# Patient Record
Sex: FEMALE | Race: WHITE | NOT HISPANIC OR LATINO | Employment: OTHER | URBAN - METROPOLITAN AREA
[De-identification: names, ages, dates, MRNs, and addresses within clinical notes are randomized per-mention and may not be internally consistent; named-entity substitution may affect disease eponyms.]

---

## 2017-06-16 ENCOUNTER — OFFICE VISIT (OUTPATIENT)
Dept: FAMILY MEDICINE | Facility: CLINIC | Age: 37
End: 2017-06-16
Payer: COMMERCIAL

## 2017-06-16 VITALS
RESPIRATION RATE: 19 BRPM | SYSTOLIC BLOOD PRESSURE: 104 MMHG | HEART RATE: 79 BPM | OXYGEN SATURATION: 98 % | HEIGHT: 67 IN | TEMPERATURE: 97.1 F | DIASTOLIC BLOOD PRESSURE: 70 MMHG

## 2017-06-16 DIAGNOSIS — N63.0 LUMP OR MASS IN BREAST: Primary | ICD-10-CM

## 2017-06-16 PROCEDURE — 99213 OFFICE O/P EST LOW 20 MIN: CPT | Performed by: PHYSICIAN ASSISTANT

## 2017-06-16 NOTE — PROGRESS NOTES
SUBJECTIVE:                                                    Clarice Mayo is a 37 year old female who presents to clinic today for the following health issues:    Breast Problem      Duration: 4 days    Description (location/character/radiation): left breast    Intensity:  mild    Accompanying signs and symptoms: harder spot on left breast-feels like when was breast feeding, left breast bigger than right breast    History (similar episodes/previous evaluation): None    Precipitating or alleviating factors: None    Therapies tried and outcome: None    LMP - 5/29/2017     Left breast is painful, feels like clogged duct.  Hasn't been breastfeeding for a few years, but stperil does produce some milk at times.  Typically gets fullness in both breasts with menstrual cycle, not only one.      Problem list and histories reviewed & adjusted, as indicated.  Additional history: as documented    Patient Active Problem List   Diagnosis     Vitamin D deficiency     CARDIOVASCULAR SCREENING; LDL GOAL LESS THAN 160     JENNA (generalized anxiety disorder)     History reviewed. No pertinent surgical history.    Social History   Substance Use Topics     Smoking status: Never Smoker     Smokeless tobacco: Never Used     Alcohol use Yes     Family History   Problem Relation Age of Onset     Breast Cancer Other      maternal aunt     Colon Cancer Paternal Grandfather      Other Cancer Cousin      cousins & aunts/uncles w/various cancers     Depression Mother      Anxiety Disorder Mother      Substance Abuse Mother      recovering alcoholic since before my birth     OSTEOPOROSIS Other      great-grandmother         Current Outpatient Prescriptions   Medication Sig Dispense Refill     busPIRone (BUSPAR) 10 MG tablet TAKE 1 TABLET(10 MG) BY MOUTH THREE TIMES DAILY 30 tablet 0     No Known Allergies    Reviewed and updated as needed this visit by clinical staff  Tobacco  Allergies  Meds  Problems  Med Hx  Surg Hx  Fam Hx  Soc Hx  "       Reviewed and updated as needed this visit by Provider  Allergies  Meds  Problems         ROS:  Constitutional, HEENT, cardiovascular, pulmonary, gi and gu systems are negative, except as otherwise noted.    OBJECTIVE:                                                    /70 (BP Location: Left arm, Patient Position: Sitting, Cuff Size: Adult Regular)  Pulse 79  Temp 97.1  F (36.2  C) (Oral)  Resp 19  Ht 5' 6.75\" (1.695 m)  LMP 05/29/2017  SpO2 98%  There is no height or weight on file to calculate BMI.  GENERAL: healthy, alert and no distress  NECK: no adenopathy, no asymmetry, masses, or scars and thyroid normal to palpation  RESP: lungs clear to auscultation - no rales, rhonchi or wheezes  BREAST: bilateral fibrous breast especially in 11 o'clock positions; no tenderness or nipple discharge and no palpable axillary masses or adenopathy  CV: regular rate and rhythm, normal S1 S2, no S3 or S4, no murmur, click or rub, no peripheral edema and peripheral pulses strong  PSYCH: mentation appears normal, affect normal/bright    Diagnostic Test Results:  none      ASSESSMENT/PLAN:                                                        ICD-10-CM    1. Lump or mass in breast N63        Patient Instructions   Warm compresses and local massage encouraged.  Monitor over next menstrual cycle of two.  Consider diagnostic mammogram if continues or any changes in symptoms.  Return to clinic with any worsening or changes in symptoms and follow up for routine care.       Gisela Singh PA-C  [facility]  "

## 2017-06-16 NOTE — MR AVS SNAPSHOT
"              After Visit Summary   6/16/2017    Clarice Mayo    MRN: [id number]           Patient Information     Date Of Birth          1980        Visit Information        Provider Department      6/16/2017 11:40 AM Gisela Singh PA-C [facility]         Follow-ups after your visit        Who to contact     If you have questions or need follow up information about today's clinic visit or your schedule please contact [facility] directly at [phone].  Normal or non-critical lab and imaging results will be communicated to you by Onyuhart, letter or phone within 4 business days after the clinic has received the results. If you do not hear from us within 7 days, please contact the clinic through [employer] or phone. If you have a critical or abnormal lab result, we will notify you by phone as soon as possible.  Submit refill requests through HITbills or call your pharmacy and they will forward the refill request to us. Please allow 3 business days for your refill to be completed.          Additional Information About Your Visit        MyChart Information     HITbills gives you secure access to your electronic health record. If you see a primary care provider, you can also send messages to your care team and make appointments. If you have questions, please call your primary care clinic.  If you do not have a primary care provider, please call [phone] and they will assist you.        Care EveryWhere ID     This is your Care EveryWhere ID. This could be used by other organizations to access your [place] medical records  [id number]        Your Vitals Were     Pulse Temperature Respirations Height Last Period Pulse Oximetry    79 97.1  F (36.2  C) (Oral) 19 5' 6.75\" (1.695 m) 05/29/2017 98%       Blood Pressure from Last 3 Encounters:   06/16/17 104/70   07/12/16 106/70   06/04/15 110/70    Weight from Last 3 Encounters:   07/12/16 145 lb 8 oz (66 kg)   06/04/15 144 lb " 4.8 oz (65.5 kg)              Today, you had the following     No orders found for display         Today's Medication Changes          These changes are accurate as of: 6/16/17 11:55 AM.  If you have any questions, ask your nurse or doctor.               Stop taking these medicines if you haven't already. Please contact your care team if you have questions.     ALPRAZolam 0.25 MG tablet   Commonly known as:  XANAX   Stopped by:  Gisela Singh PA-C                    Primary Care Provider Office Phone # Fax #    Gisela Singh PA-C [phone]       [address] [address]        Thank you!     Thank you for choosing [facility]  for your care. Our goal is always to provide you with excellent care. Hearing back from our patients is one way we can continue to improve our services. Please take a few minutes to complete the written survey that you may receive in the mail after your visit with us. Thank you!             Your Updated Medication List - Protect others around you: Learn how to safely use, store and throw away your medicines at www.disposemymeds.org.          This list is accurate as of: 6/16/17 11:55 AM.  Always use your most recent med list.                   Brand Name Dispense Instructions for use    busPIRone 10 MG tablet    BUSPAR    30 tablet    TAKE 1 TABLET(10 MG) BY MOUTH THREE TIMES DAILY

## 2017-06-16 NOTE — PATIENT INSTRUCTIONS
Warm compresses and local massage encouraged.  Monitor over next menstrual cycle of two.  Consider diagnostic mammogram if continues or any changes in symptoms.  Return to clinic with any worsening or changes in symptoms and follow up for routine care.

## 2017-06-16 NOTE — NURSING NOTE
"Chief Complaint   Patient presents with     Breast Mass       Initial /70 (BP Location: Left arm, Patient Position: Sitting, Cuff Size: Adult Regular)  Pulse 79  Temp 97.1  F (36.2  C) (Oral)  Resp 19  Ht 5' 6.75\" (1.695 m)  LMP 05/29/2017  SpO2 98% Estimated body mass index is 22.96 kg/(m^2) as calculated from the following:    Height as of 7/12/16: 5' 6.75\" (1.695 m).    Weight as of 7/12/16: 145 lb 8 oz (66 kg).  Medication Reconciliation: complete     Patient declined racht    Chelsi Best CMA  "

## 2017-06-23 ENCOUNTER — TELEPHONE (OUTPATIENT)
Dept: FAMILY MEDICINE | Facility: CLINIC | Age: 37
End: 2017-06-23

## 2017-06-28 NOTE — TELEPHONE ENCOUNTER
6/28/2017    Call Regarding Preventive Health Screening Cervical/PAP    Attempt 2    Message on voicemail     Comments:       Outreach   CC

## 2017-07-05 NOTE — TELEPHONE ENCOUNTER
7/5/2017    Call Regarding Preventive Health Screening Cervical/PAP    Attempt 3    Message on voicemail     Comments:       Outreach   jay jay

## 2017-12-24 ENCOUNTER — HEALTH MAINTENANCE LETTER (OUTPATIENT)
Age: 37
End: 2017-12-24

## 2018-01-22 ENCOUNTER — ALLIED HEALTH/NURSE VISIT (OUTPATIENT)
Dept: NURSING | Facility: CLINIC | Age: 38
End: 2018-01-22
Payer: COMMERCIAL

## 2018-01-22 DIAGNOSIS — Z23 NEED FOR PROPHYLACTIC VACCINATION AND INOCULATION AGAINST INFLUENZA: Primary | ICD-10-CM

## 2018-01-22 PROCEDURE — 99207 ZZC NO CHARGE NURSE ONLY: CPT

## 2018-01-22 PROCEDURE — 90471 IMMUNIZATION ADMIN: CPT

## 2018-01-22 PROCEDURE — 90686 IIV4 VACC NO PRSV 0.5 ML IM: CPT

## 2018-01-22 NOTE — PROGRESS NOTES
Injectable Influenza Immunization Documentation    1.  Is the person to be vaccinated sick today?   No    2. Does the person to be vaccinated have an allergy to a component   of the vaccine?   No  Egg Allergy Algorithm Link    3. Has the person to be vaccinated ever had a serious reaction   to influenza vaccine in the past?   No    4. Has the person to be vaccinated ever had Guillain-Barré syndrome?   No    Form completed by Vicki Chow MA        Due to injection administration, patient instructed to remain in clinic for 15 minutes  afterwards, and to report any adverse reaction to me immediately.

## 2018-01-22 NOTE — MR AVS SNAPSHOT
After Visit Summary   1/22/2018    Clarice Mayo    MRN: [id number]           Patient Information     Date Of Birth          1980        Visit Information        Provider Department      1/22/2018 4:00 PM  MEDICAL ASSISTANT [facility]        Today's Diagnoses     Need for prophylactic vaccination and inoculation against influenza    -  1       Follow-ups after your visit        Your next 10 appointments already scheduled     Jan 22, 2018  4:00 PM CST   Nurse Only with  MEDICAL ASSISTANT   [facility]    [address] [place] [id number] [phone]              Who to contact     If you have questions or need follow up information about today's clinic visit or your schedule please contact [facility] directly at [phone].  Normal or non-critical lab and imaging results will be communicated to you by MyChart, letter or phone within 4 business days after the clinic has received the results. If you do not hear from us within 7 days, please contact the clinic through JAM Technologieshart or phone. If you have a critical or abnormal lab result, we will notify you by phone as soon as possible.  Submit refill requests through [employer] or call your pharmacy and they will forward the refill request to us. Please allow 3 business days for your refill to be completed.          Additional Information About Your Visit        MyChart Information     [employer] gives you secure access to your electronic health record. If you see a primary care provider, you can also send messages to your care team and make appointments. If you have questions, please call your primary care clinic.  If you do not have a primary care provider, please call [phone] and they will assist you.        Care EveryWhere ID     This is your Care EveryWhere ID. This could be used by other organizations to access your [facility]  records  [id number]         Blood Pressure from Last 3 Encounters:   06/16/17 104/70   07/12/16 106/70   06/04/15 110/70    Weight from Last 3 Encounters:   07/12/16 145 lb 8 oz (66 kg)   06/04/15 144 lb 4.8 oz (65.5 kg)              We Performed the Following     FLU VAC, SPLIT VIRUS IM > 3 YO (QUADRIVALENT) [54104]     Vaccine Administration, Initial [16529]        Primary Care Provider Office Phone # Fax #    Gisela Singh PA-C [phone] [address]  [facility] [address]        Equal Access to Services     DAVON REA : Hadii kelly Patino, waraghavendrada dung, qaybta kaalmada rigoberto, bao montero. So [facility] [phone].    ATENCIÓN: Si habla español, tiene a marquez disposición servicios gratuitos de asistencia lingüística. Llame al [phone].    We comply with applicable federal civil rights laws and [place] laws. We do not discriminate on the basis of race, color, national origin, age, disability, sex, sexual orientation, or gender identity.            Thank you!     Thank you for choosing [facility]  for your care. Our goal is always to provide you with excellent care. Hearing back from our patients is one way we can continue to improve our services. Please take a few minutes to complete the written survey that you may receive in the mail after your visit with us. Thank you!             Your Updated Medication List - Protect others around you: Learn how to safely use, store and throw away your medicines at www.disposemymeds.org.          This list is accurate as of: 1/22/18  3:25 PM.  Always use your most recent med list.                   Brand Name Dispense Instructions for use Diagnosis    busPIRone 10 MG tablet    BUSPAR    30 tablet    Take 1 tablet (10 mg) by mouth 3 times daily    Situational anxiety

## 2018-08-20 ENCOUNTER — OFFICE VISIT (OUTPATIENT)
Dept: FAMILY MEDICINE | Facility: CLINIC | Age: 38
End: 2018-08-20
Payer: COMMERCIAL

## 2018-08-20 VITALS
TEMPERATURE: 98.7 F | HEART RATE: 79 BPM | WEIGHT: 140 LBS | SYSTOLIC BLOOD PRESSURE: 116 MMHG | RESPIRATION RATE: 19 BRPM | DIASTOLIC BLOOD PRESSURE: 78 MMHG | BODY MASS INDEX: 22.5 KG/M2 | OXYGEN SATURATION: 100 % | HEIGHT: 66 IN

## 2018-08-20 DIAGNOSIS — E55.9 VITAMIN D DEFICIENCY: ICD-10-CM

## 2018-08-20 DIAGNOSIS — F41.8 SITUATIONAL ANXIETY: ICD-10-CM

## 2018-08-20 DIAGNOSIS — M54.50 ACUTE BILATERAL LOW BACK PAIN WITHOUT SCIATICA: ICD-10-CM

## 2018-08-20 DIAGNOSIS — Z00.00 ROUTINE GENERAL MEDICAL EXAMINATION AT A HEALTH CARE FACILITY: Primary | ICD-10-CM

## 2018-08-20 PROBLEM — R55 SYNCOPE AND COLLAPSE: Status: ACTIVE | Noted: 2018-08-20

## 2018-08-20 LAB
BASOPHILS # BLD AUTO: 0 10E9/L (ref 0–0.2)
BASOPHILS NFR BLD AUTO: 0.5 %
DIFFERENTIAL METHOD BLD: NORMAL
EOSINOPHIL # BLD AUTO: 0.2 10E9/L (ref 0–0.7)
EOSINOPHIL NFR BLD AUTO: 3.6 %
ERYTHROCYTE [DISTWIDTH] IN BLOOD BY AUTOMATED COUNT: 13.8 % (ref 10–15)
HCT VFR BLD AUTO: 36.4 % (ref 35–47)
HGB BLD-MCNC: 11.8 G/DL (ref 11.7–15.7)
LYMPHOCYTES # BLD AUTO: 1.5 10E9/L (ref 0.8–5.3)
LYMPHOCYTES NFR BLD AUTO: 22.1 %
MCH RBC QN AUTO: 29.4 PG (ref 26.5–33)
MCHC RBC AUTO-ENTMCNC: 32.4 G/DL (ref 31.5–36.5)
MCV RBC AUTO: 91 FL (ref 78–100)
MONOCYTES # BLD AUTO: 0.6 10E9/L (ref 0–1.3)
MONOCYTES NFR BLD AUTO: 9.2 %
NEUTROPHILS # BLD AUTO: 4.3 10E9/L (ref 1.6–8.3)
NEUTROPHILS NFR BLD AUTO: 64.6 %
PLATELET # BLD AUTO: 271 10E9/L (ref 150–450)
RBC # BLD AUTO: 4.02 10E12/L (ref 3.8–5.2)
WBC # BLD AUTO: 6.6 10E9/L (ref 4–11)

## 2018-08-20 PROCEDURE — 85025 COMPLETE CBC W/AUTO DIFF WBC: CPT | Performed by: PHYSICIAN ASSISTANT

## 2018-08-20 PROCEDURE — 36415 COLL VENOUS BLD VENIPUNCTURE: CPT | Performed by: PHYSICIAN ASSISTANT

## 2018-08-20 PROCEDURE — 82306 VITAMIN D 25 HYDROXY: CPT | Performed by: PHYSICIAN ASSISTANT

## 2018-08-20 PROCEDURE — 84443 ASSAY THYROID STIM HORMONE: CPT | Performed by: PHYSICIAN ASSISTANT

## 2018-08-20 PROCEDURE — 82728 ASSAY OF FERRITIN: CPT | Performed by: PHYSICIAN ASSISTANT

## 2018-08-20 PROCEDURE — 99395 PREV VISIT EST AGE 18-39: CPT | Performed by: PHYSICIAN ASSISTANT

## 2018-08-20 RX ORDER — BUSPIRONE HYDROCHLORIDE 10 MG/1
10 TABLET ORAL 3 TIMES DAILY
Qty: 30 TABLET | Refills: 3 | Status: SHIPPED | OUTPATIENT
Start: 2018-08-20 | End: 2019-09-09

## 2018-08-20 RX ORDER — CYCLOBENZAPRINE HCL 10 MG
5-10 TABLET ORAL 3 TIMES DAILY PRN
Qty: 30 TABLET | Refills: 1 | Status: SHIPPED | OUTPATIENT
Start: 2018-08-20 | End: 2019-08-10

## 2018-08-20 ASSESSMENT — ANXIETY QUESTIONNAIRES
5. BEING SO RESTLESS THAT IT IS HARD TO SIT STILL: NOT AT ALL
GAD7 TOTAL SCORE: 5
IF YOU CHECKED OFF ANY PROBLEMS ON THIS QUESTIONNAIRE, HOW DIFFICULT HAVE THESE PROBLEMS MADE IT FOR YOU TO DO YOUR WORK, TAKE CARE OF THINGS AT HOME, OR GET ALONG WITH OTHER PEOPLE: NOT DIFFICULT AT ALL
1. FEELING NERVOUS, ANXIOUS, OR ON EDGE: MORE THAN HALF THE DAYS
6. BECOMING EASILY ANNOYED OR IRRITABLE: SEVERAL DAYS
7. FEELING AFRAID AS IF SOMETHING AWFUL MIGHT HAPPEN: NOT AT ALL
3. WORRYING TOO MUCH ABOUT DIFFERENT THINGS: NOT AT ALL
2. NOT BEING ABLE TO STOP OR CONTROL WORRYING: NOT AT ALL

## 2018-08-20 ASSESSMENT — PATIENT HEALTH QUESTIONNAIRE - PHQ9: 5. POOR APPETITE OR OVEREATING: MORE THAN HALF THE DAYS

## 2018-08-20 NOTE — PATIENT INSTRUCTIONS
"  Preventive Health Recommendations  Female Ages 26 - 39  Yearly exam:   See your health care provider every year in order to    Review health changes.     Discuss preventive care.      Review your medicines if you your doctor has prescribed any.    Until age 30: Get a Pap test every three years (more often if you have had an abnormal result).    After age 30: Talk to your doctor about whether you should have a Pap test every 3 years or have a Pap test with HPV screening every 5 years.   You do not need a Pap test if your uterus was removed (hysterectomy) and you have not had cancer.  You should be tested each year for STDs (sexually transmitted diseases), if you're at risk.   Talk to your provider about how often to have your cholesterol checked.  If you are at risk for diabetes, you should have a diabetes test (fasting glucose).  Shots: Get a flu shot each year. Get a tetanus shot every 10 years.   Nutrition:     Eat at least 5 servings of fruits and vegetables each day.    Eat whole-grain bread, whole-wheat pasta and brown rice instead of white grains and rice.    Get adequate Calcium and Vitamin D.     Lifestyle    Exercise at least 150 minutes a week (30 minutes a day, 5 days of the week). This will help you control your weight and prevent disease.    Limit alcohol to one drink per day.    No smoking.     Wear sunscreen to prevent skin cancer.    See your dentist every six months for an exam and cleaning.      Discussed the pathophysiology of anxiety/depression episodes and the various symptoms seen associated with anxiety episodes. Discussed possible triggers including fatigue, depression, stress, and chemicals such as alcohol, caffeine and certain drugs. Discussed the treatment including an aerobic exercise program, adequate rest, and both rescue meds and maintenance meds.   For your anxiety:   1. Consider therapy - CBT - cognitive behavioral therapy - Fabien Spence's card given to patient.  2. \"The Chemistry " "of Calm\" by Darius Vázquez   3. \"Hope and Help for your Nerves\" by Marika Bartlett   4. Magnesium 250-500 mg nightly;  Vitamin D 0361-0824 IU daily +/- B Complex  5. Valerian root extract for relaxation and sleep OR Melatonin at bedtime.     "

## 2018-08-20 NOTE — MR AVS SNAPSHOT
After Visit Summary   8/20/2018    Clarice Mayo    MRN: [id number]           Patient Information     Date Of Birth          1980        Visit Information        Provider Department      8/20/2018 4:00 PM Gisela Singh PA-C [facility]        Today's Diagnoses     Routine general medical examination at a health care facility    -  1    Situational anxiety        Vitamin D deficiency        Acute bilateral low back pain without sciatica          Care Instructions      Preventive Health Recommendations  Female Ages 26 - 39  Yearly exam:   See your health care provider every year in order to    Review health changes.     Discuss preventive care.      Review your medicines if you your doctor has prescribed any.    Until age 30: Get a Pap test every three years (more often if you have had an abnormal result).    After age 30: Talk to your doctor about whether you should have a Pap test every 3 years or have a Pap test with HPV screening every 5 years.   You do not need a Pap test if your uterus was removed (hysterectomy) and you have not had cancer.  You should be tested each year for STDs (sexually transmitted diseases), if you're at risk.   Talk to your provider about how often to have your cholesterol checked.  If you are at risk for diabetes, you should have a diabetes test (fasting glucose).  Shots: Get a flu shot each year. Get a tetanus shot every 10 years.   Nutrition:     Eat at least 5 servings of fruits and vegetables each day.    Eat whole-grain bread, whole-wheat pasta and brown rice instead of white grains and rice.    Get adequate Calcium and Vitamin D.     Lifestyle    Exercise at least 150 minutes a week (30 minutes a day, 5 days of the week). This will help you control your weight and prevent disease.    Limit alcohol to one drink per day.    No smoking.     Wear sunscreen to prevent skin cancer.    See your dentist every six months for an exam and  "cleaning.      Discussed the pathophysiology of anxiety/depression episodes and the various symptoms seen associated with anxiety episodes. Discussed possible triggers including fatigue, depression, stress, and chemicals such as alcohol, caffeine and certain drugs. Discussed the treatment including an aerobic exercise program, adequate rest, and both rescue meds and maintenance meds.   For your anxiety:   1. Consider therapy - CBT - cognitive behavioral therapy - Fabien Jordy's card given to patient.  2. \"The Chemistry of Calm\" by Darius Vázquez   3. \"Hope and Help for your Nerves\" by Marika Bartlett   4. Vitamin D 6211-1953 IU daily +/- B Complex  5. Valerian root extract for relaxation and sleep OR Melatonin at bedtime.             Follow-ups after your visit        Additional Services     ALLERGY/ASTHMA ADULT REFERRAL       Your provider has referred you to: [facility] - [phone]  [email or web address]  FHN: [facility] - [place] [phone]   [email or web address]  FHN: [facility]. - [place] [phone]   [email or web address]  [facility] - [place] [phone]   [email or web address]  [facility] [phone]   [email or web address]    Please be aware that coverage of these services is subject to the terms and limitations of your health insurance plan.  Call member services at your health plan with any benefit or coverage questions.      Please bring the following with you to your appointment:    (1) Any X-Rays, CTs or MRIs which have been performed.  Contact the facility where they were done to arrange for  prior to your scheduled appointment.    (2) List of current medications  (3) This referral request   (4) Any documents/labs given to you for this referral                  Follow-up " "notes from your care team     Return if symptoms worsen or fail to improve.      Who to contact     If you have questions or need follow up information about today's clinic visit or your schedule please contact [facility] directly at [phone].  Normal or non-critical lab and imaging results will be communicated to you by MyChart, letter or phone within 4 business days after the clinic has received the results. If you do not hear from us within 7 days, please contact the clinic through Auth0hart or phone. If you have a critical or abnormal lab result, we will notify you by phone as soon as possible.  Submit refill requests through [employer] or call your pharmacy and they will forward the refill request to us. Please allow 3 business days for your refill to be completed.          Additional Information About Your Visit        [email or web address] Information     [employer] gives you secure access to your electronic health record. If you see a primary care provider, you can also send messages to your care team and make appointments. If you have questions, please call your primary care clinic.  If you do not have a primary care provider, please call [phone] and they will assist you.        Care EveryWhere ID     This is your Care EveryWhere ID. This could be used by other organizations to access your [place] medical records  [id number]        Your Vitals Were     Pulse Temperature Respirations Height Last Period Pulse Oximetry    79 98.7  F (37.1  C) (Oral) 19 5' 6.25\" (1.683 m) 07/19/2018 100%    BMI (Body Mass Index)                   22.43 kg/m2            Blood Pressure from Last 3 Encounters:   08/20/18 116/78   06/16/17 104/70   07/12/16 106/70    Weight from Last 3 Encounters:   08/20/18 140 lb (63.5 kg)   07/12/16 145 lb 8 oz (66 kg)   06/04/15 144 lb 4.8 oz (65.5 kg)              We Performed the Following     ALLERGY/ASTHMA ADULT REFERRAL     CBC with platelets differential     Ferritin     TSH with " free T4 reflex     Vitamin D Deficiency          Today's Medication Changes          These changes are accurate as of 8/20/18  4:41 PM.  If you have any questions, ask your nurse or doctor.               Start taking these medicines.        Dose/Directions    cyclobenzaprine 10 MG tablet   Commonly known as:  FLEXERIL   Used for:  Acute bilateral low back pain without sciatica   Started by:  Gisela Singh PA-C        Dose:  5-10 mg   Take 0.5-1 tablets (5-10 mg) by mouth 3 times daily as needed for muscle spasms   Quantity:  30 tablet   Refills:  1            Where to get your medicines      These medications were sent to [facility] [address] [address] [address]     Phone:  [phone]     busPIRone 10 MG tablet    cyclobenzaprine 10 MG tablet                Primary Care Provider Office Phone # Fax #    Gisela Singh PA-C [phone]       [address]  [facility] [address]        Equal Access to Services     KARAN [facility] MadisonJUSTIN : Hadii kelly ku hadasho Soomaali, waaxda luqadaha, qaybta kaalmada adeegyada, waxay tristian haykaro barnett . So [facility] [phone].    ATENCIÓN: Si habla español, tiene a marquez disposición servicios gratuitos de asistencia lingüística. Llame al [phone].    We comply with applicable federal civil rights laws and [place] laws. We do not discriminate on the basis of race, color, national origin, age, disability, sex, sexual orientation, or gender identity.            Thank you!     Thank you for choosing [facility]  for your care. Our goal is always to provide you with excellent care. Hearing back from our patients is one way we can continue to improve our services. Please take a few minutes to complete the written survey that you may receive in the mail after your visit with us. Thank you!             Your Updated Medication List - Protect  others around you: Learn how to safely use, store and throw away your medicines at www.disposemymeds.org.          This list is accurate as of 8/20/18  4:41 PM.  Always use your most recent med list.                   Brand Name Dispense Instructions for use Diagnosis    busPIRone 10 MG tablet    BUSPAR    30 tablet    Take 1 tablet (10 mg) by mouth 3 times daily    Situational anxiety       cyclobenzaprine 10 MG tablet    FLEXERIL    30 tablet    Take 0.5-1 tablets (5-10 mg) by mouth 3 times daily as needed for muscle spasms    Acute bilateral low back pain without sciatica

## 2018-08-20 NOTE — PROGRESS NOTES
SUBJECTIVE:   CC: Clarice Mayo is an 38 year old woman who presents for preventive health visit.     Physical   Annual:     Getting at least 3 servings of Calcium per day:  Yes    Bi-annual eye exam:  NO    Dental care twice a year:  NO    Sleep apnea or symptoms of sleep apnea:  Daytime drowsiness    Diet:  Vegetarian/vegan    Frequency of exercise:  2-3 days/week    Duration of exercise:  15-30 minutes    Taking medications regularly:  Yes    Medication side effects:  Not applicable    Additional concerns today:  YES    Other:   - somatic anxiety symptoms, but wondering if something more.      Today's PHQ-2 Score:   PHQ-2 ( 1999 Pfizer) 8/20/2018   Q1: Little interest or pleasure in doing things 0   Q2: Feeling down, depressed or hopeless 0   PHQ-2 Score 0   Q1: Little interest or pleasure in doing things Not at all   Q2: Feeling down, depressed or hopeless Not at all   PHQ-2 Score 0       Abuse: Current or Past (Physical, Sexual or Emotional) - No  Do you feel safe in your environment - Yes    Social History   Substance Use Topics     Smoking status: Never Smoker     Smokeless tobacco: Never Used     Alcohol use Yes     Alcohol Use 8/20/2018   If you drink alcohol do you typically have greater than 3 drinks per day OR greater than 7 drinks per week? No   No flowsheet data found.    Reviewed orders with patient.  Reviewed health maintenance and updated orders accordingly - Yes  Labs reviewed in EPIC  Patient Active Problem List   Diagnosis     Vitamin D deficiency     CARDIOVASCULAR SCREENING; LDL GOAL LESS THAN 160     JENNA (generalized anxiety disorder)     Syncope and collapse     History reviewed. No pertinent surgical history.    Social History   Substance Use Topics     Smoking status: Never Smoker     Smokeless tobacco: Never Used     Alcohol use Yes     Family History   Problem Relation Age of Onset     Breast Cancer Other      maternal aunt     Colon Cancer Paternal Grandfather      Other Cancer  "Cousin      cousins & aunts/uncles w/various cancers     Depression Mother      Anxiety Disorder Mother      Substance Abuse Mother      recovering alcoholic since before my birth     Osteoperosis Other      great-grandmother         Current Outpatient Prescriptions   Medication Sig Dispense Refill     busPIRone (BUSPAR) 10 MG tablet Take 1 tablet (10 mg) by mouth 3 times daily 30 tablet 3     cyclobenzaprine (FLEXERIL) 10 MG tablet Take 0.5-1 tablets (5-10 mg) by mouth 3 times daily as needed for muscle spasms 30 tablet 1     No Known Allergies    Mammogram not appropriate for this patient based on age.    Pertinent mammograms are reviewed under the imaging tab.  History of abnormal Pap smear: NO - age 30- 65 PAP every 3 years recommended     Reviewed and updated as needed this visit by clinical staff  Tobacco  Allergies  Meds  Problems  Med Hx  Surg Hx  Fam Hx  Soc Hx          Reviewed and updated as needed this visit by Provider  Allergies  Meds  Problems            Review of Systems  CONSTITUTIONAL: NEGATIVE for fever, chills, change in weight  INTEGUMENTARU/SKIN: NEGATIVE for worrisome rashes, moles or lesions  EYES: NEGATIVE for vision changes or irritation  ENT: NEGATIVE for ear, mouth and throat problems  RESP: NEGATIVE for significant cough or SOB  BREAST: NEGATIVE for masses, tenderness or discharge  CV: NEGATIVE for chest pain, palpitations or peripheral edema  GI: NEGATIVE for nausea, abdominal pain, heartburn, or change in bowel habits  : NEGATIVE for unusual urinary or vaginal symptoms. Periods are regular.  MUSCULOSKELETAL: NEGATIVE for significant arthralgias or myalgia  NEURO: NEGATIVE for weakness, dizziness or paresthesias  PSYCHIATRIC: NEGATIVE for changes in mood or affect     OBJECTIVE:   /78 (BP Location: Left arm, Patient Position: Sitting, Cuff Size: Adult Regular)  Pulse 79  Temp 98.7  F (37.1  C) (Oral)  Resp 19  Ht 5' 6.25\" (1.683 m)  Wt 140 lb (63.5 kg)  LMP " "07/19/2018  SpO2 100%  BMI 22.43 kg/m2  Physical Exam  GENERAL: healthy, alert and no distress  EYES: Eyes grossly normal to inspection, PERRL and conjunctivae and sclerae normal  HENT: ear canals and TM's normal, nose and mouth without ulcers or lesions  NECK: no adenopathy, no asymmetry, masses, or scars and thyroid normal to palpation  RESP: lungs clear to auscultation - no rales, rhonchi or wheezes  CV: regular rate and rhythm, normal S1 S2, no S3 or S4, no murmur, click or rub, no peripheral edema and peripheral pulses strong  ABDOMEN: soft, nontender, no hepatosplenomegaly, no masses and bowel sounds normal  MS: no gross musculoskeletal defects noted, no edema  SKIN: no suspicious lesions or rashes  NEURO: Normal strength and tone, mentation intact and speech normal  PSYCH: mentation appears normal, affect normal/bright    Diagnostic Test Results:  none     ASSESSMENT/PLAN:       ICD-10-CM    1. Routine general medical examination at a health care facility Z00.00 ALLERGY/ASTHMA ADULT REFERRAL   2. Situational anxiety F41.8 busPIRone (BUSPAR) 10 MG tablet     CBC with platelets differential     Ferritin     TSH with free T4 reflex   3. Vitamin D deficiency E55.9 Vitamin D Deficiency   4. Acute bilateral low back pain without sciatica M54.5 cyclobenzaprine (FLEXERIL) 10 MG tablet       COUNSELING:  Reviewed preventive health counseling, as reflected in patient instructions       Regular exercise       Healthy diet/nutrition       Vision screening    BP Readings from Last 1 Encounters:   08/20/18 116/78     Estimated body mass index is 22.43 kg/(m^2) as calculated from the following:    Height as of this encounter: 5' 6.25\" (1.683 m).    Weight as of this encounter: 140 lb (63.5 kg).   reports that she has never smoked. She has never used smokeless tobacco.      Counseling Resources:  ATP IV Guidelines  Pooled Cohorts Equation Calculator  Breast Cancer Risk Calculator  FRAX Risk Assessment  ICSI Preventive " Guidelines  Dietary Guidelines for Americans, 2010  USDA's MyPlate  ASA Prophylaxis  Lung CA Screening    Gisela Singh PA-C  [facility] [place]    Answers for HPI/ROS submitted by the patient on 8/20/2018   PHQ-2 Score: 0

## 2018-08-21 LAB
DEPRECATED CALCIDIOL+CALCIFEROL SERPL-MC: 35 UG/L (ref 20–75)
FERRITIN SERPL-MCNC: 19 NG/ML (ref 12–150)
TSH SERPL DL<=0.005 MIU/L-ACNC: 2.35 MU/L (ref 0.4–4)

## 2018-08-21 ASSESSMENT — ANXIETY QUESTIONNAIRES: GAD7 TOTAL SCORE: 5

## 2018-08-21 ASSESSMENT — PATIENT HEALTH QUESTIONNAIRE - PHQ9: SUM OF ALL RESPONSES TO PHQ QUESTIONS 1-9: 6

## 2018-08-21 NOTE — PROGRESS NOTES
"Shelia Oneil  Your attached labs are reassuringly (annoyingly?) normal.   I chatted with our neurologist, and it's definitely an option to follow up with him regarding some of your symptoms as the next step.  Let me know how/if you would like to proceed.  Please contact the office with any questions or concerns.    Gisela Mitchell \"Chucho\" CHYNA Singh  "

## 2018-08-29 ENCOUNTER — MYC MEDICAL ADVICE (OUTPATIENT)
Dept: FAMILY MEDICINE | Facility: CLINIC | Age: 38
End: 2018-08-29

## 2018-11-28 ENCOUNTER — ALLIED HEALTH/NURSE VISIT (OUTPATIENT)
Dept: NURSING | Facility: CLINIC | Age: 38
End: 2018-11-28
Payer: COMMERCIAL

## 2018-11-28 DIAGNOSIS — Z23 NEED FOR PROPHYLACTIC VACCINATION AND INOCULATION AGAINST INFLUENZA: Primary | ICD-10-CM

## 2018-11-28 PROCEDURE — 99207 ZZC NO CHARGE NURSE ONLY: CPT

## 2018-11-28 PROCEDURE — 90471 IMMUNIZATION ADMIN: CPT

## 2018-11-28 PROCEDURE — 90686 IIV4 VACC NO PRSV 0.5 ML IM: CPT

## 2018-11-28 NOTE — MR AVS SNAPSHOT
After Visit Summary   11/28/2018    Clarice Mayo    MRN: [id number]           Patient Information     Date Of Birth          1980        Visit Information        Provider Department      11/28/2018 10:00 AM [facility] MEDICAL ASSISTANT [facility]        Today's Diagnoses     Need for prophylactic vaccination and inoculation against influenza    -  1       Follow-ups after your visit        Who to contact     If you have questions or need follow up information about today's clinic visit or your schedule please [facility] directly at [phone].  Normal or non-critical lab and imaging results will be communicated to you by Penemarie K Murphyhart, letter or phone within 4 business days after the clinic has received the results. If you do not hear from us within 7 days, please contact the clinic through Only-apartment[employer] or phone. If you have a critical or abnormal lab result, we will notify you by phone as soon as possible.  Submit refill requests through [employer] or call your pharmacy and they will forward the refill request to us. Please allow 3 business days for your refill to be completed.          Additional Information About Your Visit        MyChart Information     [employer] gives you secure access to your electronic health record. If you see a primary care provider, you can also send messages to your care team and make appointments. If you have questions, please call your primary care clinic.  If you do not have a primary care provider, please call [phone] and they will assist you.        Care EveryWhere ID     This is your Care EveryWhere ID. This could be used by other organizations to access your [facility] medical records  [id number]         Blood Pressure from Last 3 Encounters:   08/20/18 116/78   06/16/17 104/70   07/12/16 106/70    Weight from Last 3 Encounters:   08/20/18 140 lb (63.5 kg)   07/12/16 145 lb 8 oz (66 kg)   06/04/15 144 lb 4.8 oz (65.5 kg)              We  Performed the Following     FLU VACCINE, SPLIT VIRUS, IM (QUADRIVALENT) [73120]- >3 YRS     Vaccine Administration, Initial [06101]        Primary Care Provider Office Phone # Fax #    Gisela Singh PA-C [phone] [address]  [facility] [address]        Equal Access to Services     DAVON REA : Hadii aad ku hadasho Soomaali, waaxda luqadaha, qaybta kaalmada adeegyada, waxay angelin hayaan adeeg karelypriscillacarl labridgette . So [facility] [phone].    ATENCIÓN: Si habla español, tiene a marquez disposición servicios gratuitos de asistencia lingüística. Kate al [phone].    We comply with applicable federal civil rights laws and [place] laws. We do not discriminate on the basis of race, color, national origin, age, disability, sex, sexual orientation, or gender identity.            Thank you!     Thank you for choosing [facility]  for your care. Our goal is always to provide you with excellent care. Hearing back from our patients is one way we can continue to improve our services. Please take a few minutes to complete the written survey that you may receive in the mail after your visit with us. Thank you!             Your Updated Medication List - Protect others around you: Learn how to safely use, store and throw away your medicines at www.disposemymeds.org.          This list is accurate as of 11/28/18 10:05 AM.  Always use your most recent med list.                   Brand Name Dispense Instructions for use Diagnosis    busPIRone 10 MG tablet    BUSPAR    30 tablet    Take 1 tablet (10 mg) by mouth 3 times daily    Situational anxiety       cyclobenzaprine 10 MG tablet    FLEXERIL    30 tablet    Take 0.5-1 tablets (5-10 mg) by mouth 3 times daily as needed for muscle spasms    Acute bilateral low back pain without sciatica

## 2018-11-28 NOTE — PROGRESS NOTES

## 2019-08-10 DIAGNOSIS — M54.50 ACUTE BILATERAL LOW BACK PAIN WITHOUT SCIATICA: ICD-10-CM

## 2019-08-10 NOTE — TELEPHONE ENCOUNTER
Requested Prescriptions   Pending Prescriptions Disp Refills     cyclobenzaprine (FLEXERIL) 10 MG tablet [Pharmacy Med Name: CYCLOBENZAPRINE 10MG TABLETS] 30 tablet 0     Sig: TAKE 1/2 TO 1 TABLET(5 TO 10 MG) BY MOUTH THREE TIMES DAILY AS NEEDED FOR MUSCLE SPASMS  Last Written Prescription Date:  8/20/18  Last Fill Quantity: 30 tab,  # refills: 1   Last office visit: 8/20/2018 with prescribing provider:  Samantha   Future Office Visit:         There is no refill protocol information for this order

## 2019-08-12 RX ORDER — CYCLOBENZAPRINE HCL 10 MG
TABLET ORAL
Qty: 30 TABLET | Refills: 0 | Status: SHIPPED | OUTPATIENT
Start: 2019-08-12 | End: 2019-12-05

## 2019-09-09 DIAGNOSIS — F41.8 SITUATIONAL ANXIETY: ICD-10-CM

## 2019-09-09 NOTE — TELEPHONE ENCOUNTER
"Requested Prescriptions   Pending Prescriptions Disp Refills     busPIRone (BUSPAR) 10 MG tablet [Pharmacy Med Name: BUSPIRONE 10MG TABLETS] 30 tablet 0     Sig: TAKE 1 TABLET(10 MG) BY MOUTH THREE TIMES DAILY  Last Written Prescription Date:  8/20/2018  Last Fill Quantity: 30 tablet,  # refills: 3   Last Office Visit: 8/20/2018   Future Office Visit:            Atypical Antidepressants Protocol Failed - 9/9/2019  5:52 PM        Failed - Recent (12 mo) or future (30 days) visit within the authorizing provider's specialty     Patient had office visit in the last 12 months or has a visit in the next 30 days with authorizing provider or within the authorizing provider's specialty.  See \"Patient Info\" tab in inbasket, or \"Choose Columns\" in Meds & Orders section of the refill encounter.            Passed - Medication active on med list        Passed - Patient is age 18 or older        Passed - No active pregnancy on record        Passed - No positive pregnancy test in past 12 mos          "

## 2019-09-12 RX ORDER — BUSPIRONE HYDROCHLORIDE 10 MG/1
TABLET ORAL
Qty: 30 TABLET | Refills: 0 | Status: SHIPPED | OUTPATIENT
Start: 2019-09-12 | End: 2019-12-05

## 2019-09-12 NOTE — TELEPHONE ENCOUNTER
Routing refill request to provider for review/approval because:  --A break in medication  --Patient may be taking PRN?  I am not able to reach her to confirm.  --Patient is overdue for annual office visit.  --I pended minimal dispense with reminder -  for provider to authorize if ok.     ,  --Please contact patient and ask her to schedule an annual office visit.    --If not able to reach live please mail letter.  --A refill request for below medication was sent to the provider.

## 2019-12-03 ASSESSMENT — ENCOUNTER SYMPTOMS
JOINT SWELLING: 0
PARESTHESIAS: 0
BREAST MASS: 0
NAUSEA: 0
FREQUENCY: 0
ARTHRALGIAS: 0
NERVOUS/ANXIOUS: 0
WEAKNESS: 0
EYE PAIN: 0
HEMATURIA: 0
CONSTIPATION: 0
CHILLS: 0
MYALGIAS: 1
PALPITATIONS: 0
ABDOMINAL PAIN: 0
SORE THROAT: 0
FEVER: 0
SHORTNESS OF BREATH: 0
HEADACHES: 1
HEMATOCHEZIA: 0
COUGH: 0
HEARTBURN: 0
DIZZINESS: 0
DIARRHEA: 0
DYSURIA: 0

## 2019-12-05 ENCOUNTER — OFFICE VISIT (OUTPATIENT)
Dept: FAMILY MEDICINE | Facility: CLINIC | Age: 39
End: 2019-12-05
Payer: COMMERCIAL

## 2019-12-05 VITALS
DIASTOLIC BLOOD PRESSURE: 68 MMHG | SYSTOLIC BLOOD PRESSURE: 109 MMHG | HEART RATE: 91 BPM | RESPIRATION RATE: 22 BRPM | HEIGHT: 67 IN | TEMPERATURE: 98.6 F | BODY MASS INDEX: 21.66 KG/M2 | OXYGEN SATURATION: 100 % | WEIGHT: 138 LBS

## 2019-12-05 DIAGNOSIS — Z23 NEED FOR IMMUNIZATION AGAINST INFLUENZA: ICD-10-CM

## 2019-12-05 DIAGNOSIS — Z00.00 ROUTINE GENERAL MEDICAL EXAMINATION AT A HEALTH CARE FACILITY: Primary | ICD-10-CM

## 2019-12-05 DIAGNOSIS — R82.90 NONSPECIFIC FINDING ON EXAMINATION OF URINE: ICD-10-CM

## 2019-12-05 DIAGNOSIS — M54.50 ACUTE BILATERAL LOW BACK PAIN WITHOUT SCIATICA: ICD-10-CM

## 2019-12-05 DIAGNOSIS — R10.2 PELVIC PAIN IN FEMALE: ICD-10-CM

## 2019-12-05 DIAGNOSIS — F41.8 SITUATIONAL ANXIETY: ICD-10-CM

## 2019-12-05 LAB
ALBUMIN UR-MCNC: NEGATIVE MG/DL
APPEARANCE UR: CLEAR
BACTERIA #/AREA URNS HPF: ABNORMAL /HPF
BASOPHILS # BLD AUTO: 0 10E9/L (ref 0–0.2)
BASOPHILS NFR BLD AUTO: 0.2 %
BILIRUB UR QL STRIP: NEGATIVE
COLOR UR AUTO: YELLOW
DIFFERENTIAL METHOD BLD: NORMAL
EOSINOPHIL # BLD AUTO: 0.1 10E9/L (ref 0–0.7)
EOSINOPHIL NFR BLD AUTO: 0.8 %
ERYTHROCYTE [DISTWIDTH] IN BLOOD BY AUTOMATED COUNT: 13.4 % (ref 10–15)
GLUCOSE UR STRIP-MCNC: NEGATIVE MG/DL
HCT VFR BLD AUTO: 38.7 % (ref 35–47)
HGB BLD-MCNC: 12.5 G/DL (ref 11.7–15.7)
HGB UR QL STRIP: ABNORMAL
KETONES UR STRIP-MCNC: NEGATIVE MG/DL
LEUKOCYTE ESTERASE UR QL STRIP: ABNORMAL
LYMPHOCYTES # BLD AUTO: 1 10E9/L (ref 0.8–5.3)
LYMPHOCYTES NFR BLD AUTO: 11.2 %
MCH RBC QN AUTO: 29.7 PG (ref 26.5–33)
MCHC RBC AUTO-ENTMCNC: 32.3 G/DL (ref 31.5–36.5)
MCV RBC AUTO: 92 FL (ref 78–100)
MONOCYTES # BLD AUTO: 0.6 10E9/L (ref 0–1.3)
MONOCYTES NFR BLD AUTO: 7.3 %
NEUTROPHILS # BLD AUTO: 6.9 10E9/L (ref 1.6–8.3)
NEUTROPHILS NFR BLD AUTO: 80.5 %
NITRATE UR QL: NEGATIVE
NON-SQ EPI CELLS #/AREA URNS LPF: ABNORMAL /LPF
PH UR STRIP: 5.5 PH (ref 5–7)
PLATELET # BLD AUTO: 329 10E9/L (ref 150–450)
RBC # BLD AUTO: 4.21 10E12/L (ref 3.8–5.2)
RBC #/AREA URNS AUTO: ABNORMAL /HPF
SOURCE: ABNORMAL
SP GR UR STRIP: <=1.005 (ref 1–1.03)
UROBILINOGEN UR STRIP-ACNC: 0.2 EU/DL (ref 0.2–1)
WBC # BLD AUTO: 8.5 10E9/L (ref 4–11)
WBC #/AREA URNS AUTO: ABNORMAL /HPF

## 2019-12-05 PROCEDURE — 99395 PREV VISIT EST AGE 18-39: CPT | Mod: 25 | Performed by: PHYSICIAN ASSISTANT

## 2019-12-05 PROCEDURE — 82728 ASSAY OF FERRITIN: CPT | Performed by: PHYSICIAN ASSISTANT

## 2019-12-05 PROCEDURE — 87086 URINE CULTURE/COLONY COUNT: CPT | Performed by: PHYSICIAN ASSISTANT

## 2019-12-05 PROCEDURE — 84443 ASSAY THYROID STIM HORMONE: CPT | Performed by: PHYSICIAN ASSISTANT

## 2019-12-05 PROCEDURE — 90471 IMMUNIZATION ADMIN: CPT | Performed by: PHYSICIAN ASSISTANT

## 2019-12-05 PROCEDURE — 99213 OFFICE O/P EST LOW 20 MIN: CPT | Mod: 25 | Performed by: PHYSICIAN ASSISTANT

## 2019-12-05 PROCEDURE — 90686 IIV4 VACC NO PRSV 0.5 ML IM: CPT | Performed by: PHYSICIAN ASSISTANT

## 2019-12-05 PROCEDURE — 85025 COMPLETE CBC W/AUTO DIFF WBC: CPT | Performed by: PHYSICIAN ASSISTANT

## 2019-12-05 PROCEDURE — 81001 URINALYSIS AUTO W/SCOPE: CPT | Performed by: PHYSICIAN ASSISTANT

## 2019-12-05 PROCEDURE — 36415 COLL VENOUS BLD VENIPUNCTURE: CPT | Performed by: PHYSICIAN ASSISTANT

## 2019-12-05 RX ORDER — BUSPIRONE HYDROCHLORIDE 10 MG/1
TABLET ORAL
Qty: 30 TABLET | Refills: 3 | Status: SHIPPED | OUTPATIENT
Start: 2019-12-05

## 2019-12-05 RX ORDER — CYCLOBENZAPRINE HCL 10 MG
TABLET ORAL
Qty: 30 TABLET | Refills: 1 | Status: SHIPPED | OUTPATIENT
Start: 2019-12-05

## 2019-12-05 ASSESSMENT — ENCOUNTER SYMPTOMS
DIZZINESS: 0
PARESTHESIAS: 0
HEARTBURN: 0
SORE THROAT: 0
NAUSEA: 0
HEMATOCHEZIA: 0
BREAST MASS: 0
WEAKNESS: 0
EYE PAIN: 0
FEVER: 0
CHILLS: 0
DYSURIA: 0
HEADACHES: 1
FREQUENCY: 0
CONSTIPATION: 0
ARTHRALGIAS: 0
PALPITATIONS: 0
HEMATURIA: 0
MYALGIAS: 1
COUGH: 0
SHORTNESS OF BREATH: 0
ABDOMINAL PAIN: 0
NERVOUS/ANXIOUS: 0
JOINT SWELLING: 0
DIARRHEA: 0

## 2019-12-05 ASSESSMENT — ANXIETY QUESTIONNAIRES
7. FEELING AFRAID AS IF SOMETHING AWFUL MIGHT HAPPEN: NOT AT ALL
5. BEING SO RESTLESS THAT IT IS HARD TO SIT STILL: SEVERAL DAYS
1. FEELING NERVOUS, ANXIOUS, OR ON EDGE: SEVERAL DAYS
3. WORRYING TOO MUCH ABOUT DIFFERENT THINGS: NOT AT ALL
IF YOU CHECKED OFF ANY PROBLEMS ON THIS QUESTIONNAIRE, HOW DIFFICULT HAVE THESE PROBLEMS MADE IT FOR YOU TO DO YOUR WORK, TAKE CARE OF THINGS AT HOME, OR GET ALONG WITH OTHER PEOPLE: NOT DIFFICULT AT ALL
2. NOT BEING ABLE TO STOP OR CONTROL WORRYING: NOT AT ALL
6. BECOMING EASILY ANNOYED OR IRRITABLE: NOT AT ALL
GAD7 TOTAL SCORE: 3

## 2019-12-05 ASSESSMENT — MIFFLIN-ST. JEOR: SCORE: 1330.65

## 2019-12-05 ASSESSMENT — PATIENT HEALTH QUESTIONNAIRE - PHQ9: 5. POOR APPETITE OR OVEREATING: SEVERAL DAYS

## 2019-12-05 NOTE — PROGRESS NOTES
SUBJECTIVE:   CC: Clarice Mayo is an 39 year old woman who presents for preventive health visit.   Medical assistant advised patient about addressing additional health concerns that could be billed, as it is not considered a part of a preventive physical. Patient verbalized understanding.    Gisela Singh PA-C on 12/5/2019 at 2:50 PM    Healthy Habits:     Getting at least 3 servings of Calcium per day:  Yes    Bi-annual eye exam:  NO    Dental care twice a year:  NO    Sleep apnea or symptoms of sleep apnea:  None    Diet:  Vegetarian/vegan    Frequency of exercise:  2-3 days/week    Duration of exercise:  15-30 minutes    Taking medications regularly:  Yes    Medication side effects:  None    PHQ-2 Total Score: 0    Additional concerns today:  No    Anxiety Follow-Up    How are you doing with your anxiety since your last visit? Improved     Are you having other symptoms that might be associated with anxiety? No    Have you had a significant life event? No     Are you feeling depressed? No    Do you have any concerns with your use of alcohol or other drugs? No     Patient taking Buspar with overall good results.    Patient has history of pelvic trauma but also having symptoms so worried about missing something.  Patient repots low back pain since this summer, especially after long walks; doesn't really stick around for very long afterwards though.   She also notes vaginal discomfort/twinges, mostly on outside - similar type cramping with menses, but not currently on menses.  Some pinpoint tenderness on bilateral lower pelvic area for a few days over Thanksgiving.  No spotting, discharge, fever, chills, night sweats. Never really gets menses regularly.    Social History     Tobacco Use     Smoking status: Never Smoker     Smokeless tobacco: Never Used   Substance Use Topics     Alcohol use: Yes     Drug use: No     JENNA-7 SCORE 7/12/2016 8/20/2018 12/5/2019   Total Score 4 5 3     PHQ 8/20/2018    PHQ-9 Total Score 6   Q9: Thoughts of better off dead/self-harm past 2 weeks Not at all             Today's PHQ-2 Score:   PHQ-2 ( 1999 Pfizer) 12/3/2019   Q1: Little interest or pleasure in doing things 0   Q2: Feeling down, depressed or hopeless 0   PHQ-2 Score 0   Q1: Little interest or pleasure in doing things Not at all   Q2: Feeling down, depressed or hopeless Not at all   PHQ-2 Score 0       Abuse: Current or Past (Physical, Sexual or Emotional) - No  Do you feel safe in your environment? Yes        Social History     Tobacco Use     Smoking status: Never Smoker     Smokeless tobacco: Never Used   Substance Use Topics     Alcohol use: Yes     If you drink alcohol do you typically have >3 drinks per day or >7 drinks per week? No    No flowsheet data found.    Reviewed orders with patient.  Reviewed health maintenance and updated orders accordingly - Yes  Lab work is in process  Labs reviewed in EPIC  BP Readings from Last 3 Encounters:   12/05/19 109/68   08/20/18 116/78   06/16/17 104/70    Wt Readings from Last 3 Encounters:   12/05/19 62.6 kg (138 lb)   08/20/18 63.5 kg (140 lb)   07/12/16 66 kg (145 lb 8 oz)                  Patient Active Problem List   Diagnosis     Vitamin D deficiency     CARDIOVASCULAR SCREENING; LDL GOAL LESS THAN 160     JENNA (generalized anxiety disorder)     Syncope and collapse     History reviewed. No pertinent surgical history.    Social History     Tobacco Use     Smoking status: Never Smoker     Smokeless tobacco: Never Used   Substance Use Topics     Alcohol use: Yes     Family History   Problem Relation Age of Onset     Breast Cancer Other         maternal aunt     Colon Cancer Paternal Grandfather      Other Cancer Cousin         cousins & aunts/uncles w/various cancers     Depression Mother      Anxiety Disorder Mother      Substance Abuse Mother         recovering alcoholic since before my birth     Osteoporosis Other         great-grandmother         Current Outpatient  "Medications   Medication Sig Dispense Refill     busPIRone (BUSPAR) 10 MG tablet TAKE 1 TABLET(10 MG) BY MOUTH THREE TIMES DAILY 30 tablet 3     cyclobenzaprine (FLEXERIL) 10 MG tablet TAKE 1/2 TO 1 TABLET(5 TO 10 MG) BY MOUTH THREE TIMES DAILY AS NEEDED FOR MUSCLE SPASMS 30 tablet 1     No Known Allergies  Recent Labs   Lab Test 08/20/18  1646 05/29/13   TSH 2.35 1.66        Mammogram not appropriate for this patient based on age.    Pertinent mammograms are reviewed under the imaging tab.  History of abnormal Pap smear: NO - age 30-65 PAP every 5 years with negative HPV co-testing recommended     Reviewed and updated as needed this visit by clinical staff  Tobacco  Allergies  Meds  Problems  Med Hx  Surg Hx  Fam Hx  Soc Hx          Reviewed and updated as needed this visit by Provider  Tobacco  Allergies  Meds  Problems  Med Hx  Surg Hx  Fam Hx            Review of Systems   Constitutional: Negative for chills and fever.   HENT: Negative for congestion, ear pain, hearing loss and sore throat.    Eyes: Negative for pain and visual disturbance.   Respiratory: Negative for cough and shortness of breath.    Cardiovascular: Negative for chest pain, palpitations and peripheral edema.   Gastrointestinal: Negative for abdominal pain, constipation, diarrhea, heartburn, hematochezia and nausea.   Breasts:  Negative for tenderness, breast mass and discharge.   Genitourinary: Negative for dysuria, frequency, genital sores, hematuria, pelvic pain, urgency, vaginal bleeding and vaginal discharge.   Musculoskeletal: Positive for myalgias. Negative for arthralgias and joint swelling.   Skin: Negative for rash.   Neurological: Positive for headaches. Negative for dizziness, weakness and paresthesias.   Psychiatric/Behavioral: Negative for mood changes. The patient is not nervous/anxious.        OBJECTIVE:   /68   Pulse 91   Temp 98.6  F (37  C) (Oral)   Resp 22   Ht 1.689 m (5' 6.5\")   Wt 62.6 kg (138 lb)  "  SpO2 100%   BMI 21.94 kg/m    Physical Exam  GENERAL: healthy, alert and no distress  EYES: Eyes grossly normal to inspection, PERRL and conjunctivae and sclerae normal  HENT: ear canals and TM's normal, nose and mouth without ulcers or lesions  NECK: no adenopathy, no asymmetry, masses, or scars and thyroid normal to palpation  RESP: lungs clear to auscultation - no rales, rhonchi or wheezes  BREAST: normal without masses, tenderness or nipple discharge and no palpable axillary masses or adenopathy  CV: regular rate and rhythm, normal S1 S2, no S3 or S4, no murmur, click or rub, no peripheral edema and peripheral pulses strong  ABDOMEN: soft, nontender, no hepatosplenomegaly, no masses and bowel sounds normal  MS: no gross musculoskeletal defects noted, no edema  SKIN: no suspicious lesions or rashes  NEURO: Normal strength and tone, mentation intact and speech normal  PSYCH: mentation appears normal, affect normal/bright    Diagnostic Test Results:  Labs reviewed in Epic    ASSESSMENT/PLAN:     (R10.2) Pelvic pain in female  Comment: unclear etiology but most likely MSK vs ovarian cyst vs pelvic floor weakness  Plan: CBC with platelets differential, TSH with free         T4 reflex, UA reflex to Microscopic and         Culture, Ferritin        Labs updated today; patient defers pelvic exam at this time; consider pelvic sonogram pending above    (F41.8) Situational anxiety  Comment: Long standing, chronic, controlled at this time  Plan: busPIRone (BUSPAR) 10 MG tablet        Refills sent to pharmacy and continue with therapy    (M54.5) Acute bilateral low back pain without sciatica  Comment: Long standing, chronic, controlled at this time  Plan: cyclobenzaprine (FLEXERIL) 10 MG tablet        Refills sent to pharmacy and over the counter options discussed with patient at length.        ICD-10-CM    1. Routine general medical examination at a health care facility Z00.00    2. Pelvic pain in female R10.2 CBC with  "platelets differential     TSH with free T4 reflex     UA reflex to Microscopic and Culture     Ferritin   3. Situational anxiety F41.8 busPIRone (BUSPAR) 10 MG tablet   4. Acute bilateral low back pain without sciatica M54.5 cyclobenzaprine (FLEXERIL) 10 MG tablet   5. Need for immunization against influenza Z23 INFLUENZA VACCINE IM > 6 MONTHS VALENT IIV4 [08492]       COUNSELING:  Reviewed preventive health counseling, as reflected in patient instructions       Regular exercise       Healthy diet/nutrition       Vision screening    Estimated body mass index is 21.94 kg/m  as calculated from the following:    Height as of this encounter: 1.689 m (5' 6.5\").    Weight as of this encounter: 62.6 kg (138 lb).     reports that she has never smoked. She has never used smokeless tobacco.      Counseling Resources:  ATP IV Guidelines  Pooled Cohorts Equation Calculator  Breast Cancer Risk Calculator  FRAX Risk Assessment  ICSI Preventive Guidelines  Dietary Guidelines for Americans, 2010  USDA's MyPlate  ASA Prophylaxis  Lung CA Screening    Gisela Singh PA-C  [facility]  "

## 2019-12-06 ENCOUNTER — TELEPHONE (OUTPATIENT)
Dept: FAMILY MEDICINE | Facility: CLINIC | Age: 39
End: 2019-12-06

## 2019-12-06 LAB
BACTERIA SPEC CULT: NORMAL
FERRITIN SERPL-MCNC: 9 NG/ML (ref 12–150)
SPECIMEN SOURCE: NORMAL
TSH SERPL DL<=0.005 MIU/L-ACNC: 2.4 MU/L (ref 0.4–4)

## 2019-12-06 ASSESSMENT — ANXIETY QUESTIONNAIRES: GAD7 TOTAL SCORE: 3

## 2019-12-06 NOTE — TELEPHONE ENCOUNTER
Reason for Call:  Request for results:    Name of test or procedure: labs    Date of test of procedure: 12-5-19    Location of the test or procedure: [facility]    OK to leave the result message on voice mail or with a family member? YES    Phone number Patient can be reached at:  Home number on file [phone] (home)    Additional comments: patient is calling just received her test results with my chart and has some questions regarding them. Patient is aware that [employer] is not in. Patient also aware that might not get call back until Monday 12-9-19    Call taken on 12/6/2019 at 4:24 PM by Roxanne Lares

## 2019-12-09 ENCOUNTER — MYC MEDICAL ADVICE (OUTPATIENT)
Dept: FAMILY MEDICINE | Facility: CLINIC | Age: 39
End: 2019-12-09

## 2019-12-09 DIAGNOSIS — D50.9 IRON DEFICIENCY ANEMIA, UNSPECIFIED IRON DEFICIENCY ANEMIA TYPE: Primary | ICD-10-CM

## 2019-12-09 DIAGNOSIS — R82.90 NONSPECIFIC FINDING ON EXAMINATION OF URINE: ICD-10-CM

## 2019-12-09 RX ORDER — NITROFURANTOIN 25; 75 MG/1; MG/1
100 CAPSULE ORAL ONCE
Status: DISCONTINUED | OUTPATIENT
Start: 2019-12-09 | End: 2019-12-09 | Stop reason: CLARIF

## 2019-12-09 RX ORDER — NITROFURANTOIN 25; 75 MG/1; MG/1
100 CAPSULE ORAL 2 TIMES DAILY
Qty: 14 CAPSULE | Refills: 0 | Status: SHIPPED | OUTPATIENT
Start: 2019-12-09 | End: 2019-12-16

## 2019-12-09 NOTE — TELEPHONE ENCOUNTER
Patient states that pharmacy did not receive rx. Writer notes that rx sent today does not say that it was sent electronically or e prescribed. Pharmacy loaded. Thanks!    Faith Jurado

## 2019-12-09 NOTE — RESULT ENCOUNTER NOTE
"Shelia Oneil  Sormercedes for the delay, I agree, let's treat you for a possible UTI.  I will send a prescription for an antibiotic to your pharmacy.  Let's plan to repeat this UA with a lab only appointment in a few weeks though to make sure it has cleared up.  Also your iron is a bit low - start with low dose of ferrous sulfate 325 mgm once daily.    Please contact the office with any questions or concerns.    Gisela Mitchell \"Chucho\" CHYNA Singh    "

## 2019-12-09 NOTE — TELEPHONE ENCOUNTER
Please contact the patient with lab results as soon as possible as patient thinks that she has a UTI.

## 2019-12-09 NOTE — TELEPHONE ENCOUNTER
"Ug, I oddly ordered as a clinic administered med, fixed it and sent to pharmacy.  Sorry, thanks  Gisela \"Chucho\" CHYNA Singh   "

## 2020-02-11 ENCOUNTER — MYC MEDICAL ADVICE (OUTPATIENT)
Dept: FAMILY MEDICINE | Facility: CLINIC | Age: 40
End: 2020-02-11

## 2020-02-12 NOTE — TELEPHONE ENCOUNTER
Did you want to update HM?  Currently set for every three years.  Could edit frequency or postpone for a year.  Mago Agrawal RN

## 2020-03-05 DIAGNOSIS — R82.90 NONSPECIFIC FINDING ON EXAMINATION OF URINE: ICD-10-CM

## 2020-03-05 DIAGNOSIS — D50.9 IRON DEFICIENCY ANEMIA, UNSPECIFIED IRON DEFICIENCY ANEMIA TYPE: ICD-10-CM

## 2020-03-05 LAB
ALBUMIN UR-MCNC: NEGATIVE MG/DL
APPEARANCE UR: CLEAR
BACTERIA #/AREA URNS HPF: ABNORMAL /HPF
BILIRUB UR QL STRIP: NEGATIVE
COLOR UR AUTO: YELLOW
GLUCOSE UR STRIP-MCNC: NEGATIVE MG/DL
HGB UR QL STRIP: ABNORMAL
KETONES UR STRIP-MCNC: NEGATIVE MG/DL
LEUKOCYTE ESTERASE UR QL STRIP: NEGATIVE
NITRATE UR QL: NEGATIVE
NON-SQ EPI CELLS #/AREA URNS LPF: ABNORMAL /LPF
PH UR STRIP: 6.5 PH (ref 5–7)
RBC #/AREA URNS AUTO: ABNORMAL /HPF
SOURCE: ABNORMAL
SP GR UR STRIP: 1.01 (ref 1–1.03)
UROBILINOGEN UR STRIP-ACNC: 0.2 EU/DL (ref 0.2–1)
WBC #/AREA URNS AUTO: ABNORMAL /HPF

## 2020-03-05 PROCEDURE — 36415 COLL VENOUS BLD VENIPUNCTURE: CPT | Performed by: PHYSICIAN ASSISTANT

## 2020-03-05 PROCEDURE — 82728 ASSAY OF FERRITIN: CPT | Performed by: PHYSICIAN ASSISTANT

## 2020-03-05 PROCEDURE — 87086 URINE CULTURE/COLONY COUNT: CPT | Performed by: PHYSICIAN ASSISTANT

## 2020-03-05 PROCEDURE — 81001 URINALYSIS AUTO W/SCOPE: CPT | Performed by: PHYSICIAN ASSISTANT

## 2020-03-05 NOTE — RESULT ENCOUNTER NOTE
"Shelia Oneil  Your attached urinalysis looks much better/ is within normal limits, but I added a urine culture just to be safe (which is still pending).  Please contact the office with any questions or concerns.    Gisela Mitchell \"Chucho\" CHYNA Singh    "

## 2020-03-06 LAB
BACTERIA SPEC CULT: NO GROWTH
FERRITIN SERPL-MCNC: 14 NG/ML (ref 12–150)
SPECIMEN SOURCE: NORMAL

## 2020-03-09 NOTE — RESULT ENCOUNTER NOTE
"Heljenaro Clarice  Urine culture is negative and your ferritin (iron marker) is much improved.    Please contact the office with any questions or concerns.    Gisela Mitchell \"Chucho\" CHYNA Singh    "

## 2020-12-20 ENCOUNTER — HEALTH MAINTENANCE LETTER (OUTPATIENT)
Age: 40
End: 2020-12-20

## 2021-01-09 NOTE — TELEPHONE ENCOUNTER
Nothing was sent in today on 12/9/19.  Both meds listed from 12/5/19.  Please clarify- what med?  Reception states pt is looking for the macrobid rx.  Sending to pcp.  BERT Moore     Low Suspicion of COVID-19

## 2021-01-15 ENCOUNTER — HEALTH MAINTENANCE LETTER (OUTPATIENT)
Age: 41
End: 2021-01-15

## 2021-02-28 ENCOUNTER — HEALTH MAINTENANCE LETTER (OUTPATIENT)
Age: 41
End: 2021-02-28

## 2021-10-03 ENCOUNTER — HEALTH MAINTENANCE LETTER (OUTPATIENT)
Age: 41
End: 2021-10-03

## 2022-01-23 ENCOUNTER — HEALTH MAINTENANCE LETTER (OUTPATIENT)
Age: 42
End: 2022-01-23

## 2022-05-17 ENCOUNTER — TELEPHONE (OUTPATIENT)
Dept: FAMILY MEDICINE | Facility: CLINIC | Age: 42
End: 2022-05-17
Payer: COMMERCIAL

## 2022-05-17 DIAGNOSIS — K62.5 BRIGHT RED BLOOD PER RECTUM: Primary | ICD-10-CM

## 2022-05-17 NOTE — TELEPHONE ENCOUNTER
MP,    Pt and spouse calling.    Pt has had bright red blood with stools starting last night.  Has had 3-4 BMs and rectal bleeding since.  Seems to be a little better this am turning toilet water light pink, has been turning water dark pink.    No straining, constipation or any other reason she can think to cause this. Has had a few streaks of blood on tissue in the past but nothing pt was concerned about. Has not been diagnosed with hemorrhoids in the past.  Pt has no discomfort or pain.      Pt is concerned because a friend was just diagnosed with colon cancer. Pt is also going on vacation on Thursday.    Pt is not wanting to go to UC/ER if possible.  Did you want to add her in for VV or OV today?    Please advise.  Thanks,  Yvette Malone RN
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
## 2022-05-17 NOTE — TELEPHONE ENCOUNTER
"I'm out of the office today, but if any appointments available in clinic (or other clinics) can try to be seen.  Otherwise, I will put in a few options for a referral for Gastroenterology and they can see potential for availability. (Dr. Nix is usually pretty good about getting people in).    Thanks  Gisela \"Chucho\" CHYNA Singh   "

## 2022-05-18 ENCOUNTER — OFFICE VISIT (OUTPATIENT)
Dept: FAMILY MEDICINE | Facility: CLINIC | Age: 42
End: 2022-05-18
Payer: COMMERCIAL

## 2022-05-18 VITALS
HEART RATE: 96 BPM | BODY MASS INDEX: 24.04 KG/M2 | DIASTOLIC BLOOD PRESSURE: 77 MMHG | TEMPERATURE: 98.5 F | SYSTOLIC BLOOD PRESSURE: 114 MMHG | WEIGHT: 151.19 LBS

## 2022-05-18 DIAGNOSIS — D64.9 MILD ANEMIA: ICD-10-CM

## 2022-05-18 DIAGNOSIS — K64.4 EXTERNAL HEMORRHOIDS: Primary | ICD-10-CM

## 2022-05-18 LAB
ERYTHROCYTE [DISTWIDTH] IN BLOOD BY AUTOMATED COUNT: 13.3 % (ref 10–15)
HCT VFR BLD AUTO: 35.6 % (ref 35–53)
HGB BLD-MCNC: 11.6 G/DL (ref 11.7–17.7)
MCH RBC QN AUTO: 30.1 PG (ref 26.5–33)
MCHC RBC AUTO-ENTMCNC: 32.6 G/DL (ref 31.5–36.5)
MCV RBC AUTO: 92 FL (ref 78–100)
PLATELET # BLD AUTO: 257 10E3/UL (ref 150–450)
RBC # BLD AUTO: 3.86 10E6/UL (ref 3.8–5.9)
WBC # BLD AUTO: 7.3 10E3/UL (ref 4–11)

## 2022-05-18 PROCEDURE — 80053 COMPREHEN METABOLIC PANEL: CPT | Performed by: FAMILY MEDICINE

## 2022-05-18 PROCEDURE — 99214 OFFICE O/P EST MOD 30 MIN: CPT | Performed by: FAMILY MEDICINE

## 2022-05-18 PROCEDURE — 82728 ASSAY OF FERRITIN: CPT | Performed by: FAMILY MEDICINE

## 2022-05-18 PROCEDURE — 85027 COMPLETE CBC AUTOMATED: CPT | Performed by: FAMILY MEDICINE

## 2022-05-18 PROCEDURE — 36415 COLL VENOUS BLD VENIPUNCTURE: CPT | Performed by: FAMILY MEDICINE

## 2022-05-18 ASSESSMENT — ANXIETY QUESTIONNAIRES
6. BECOMING EASILY ANNOYED OR IRRITABLE: NOT AT ALL
GAD7 TOTAL SCORE: 7
GAD7 TOTAL SCORE: 7
3. WORRYING TOO MUCH ABOUT DIFFERENT THINGS: SEVERAL DAYS
1. FEELING NERVOUS, ANXIOUS, OR ON EDGE: MORE THAN HALF THE DAYS
5. BEING SO RESTLESS THAT IT IS HARD TO SIT STILL: NOT AT ALL
2. NOT BEING ABLE TO STOP OR CONTROL WORRYING: SEVERAL DAYS
4. TROUBLE RELAXING: MORE THAN HALF THE DAYS
7. FEELING AFRAID AS IF SOMETHING AWFUL MIGHT HAPPEN: SEVERAL DAYS
GAD7 TOTAL SCORE: 7
7. FEELING AFRAID AS IF SOMETHING AWFUL MIGHT HAPPEN: SEVERAL DAYS
8. IF YOU CHECKED OFF ANY PROBLEMS, HOW DIFFICULT HAVE THESE MADE IT FOR YOU TO DO YOUR WORK, TAKE CARE OF THINGS AT HOME, OR GET ALONG WITH OTHER PEOPLE?: SOMEWHAT DIFFICULT

## 2022-05-20 LAB
ALBUMIN SERPL-MCNC: 4.2 G/DL (ref 3.4–5)
ALP SERPL-CCNC: 32 U/L (ref 40–150)
ALT SERPL W P-5'-P-CCNC: 14 U/L (ref 0–70)
ANION GAP SERPL CALCULATED.3IONS-SCNC: 5 MMOL/L (ref 3–14)
AST SERPL W P-5'-P-CCNC: 14 U/L (ref 0–45)
BILIRUB SERPL-MCNC: 0.3 MG/DL (ref 0.2–1.3)
BUN SERPL-MCNC: 12 MG/DL (ref 7–30)
CALCIUM SERPL-MCNC: 8.8 MG/DL (ref 8.5–10.1)
CHLORIDE BLD-SCNC: 110 MMOL/L (ref 94–109)
CO2 SERPL-SCNC: 25 MMOL/L (ref 20–32)
CREAT SERPL-MCNC: 0.76 MG/DL (ref 0.52–1.25)
FERRITIN SERPL-MCNC: 16 NG/ML (ref 12–388)
GFR SERPL CREATININE-BSD FRML MDRD: >90 ML/MIN/1.73M2
GLUCOSE BLD-MCNC: 98 MG/DL (ref 70–99)
POTASSIUM BLD-SCNC: 4.1 MMOL/L (ref 3.4–5.3)
PROT SERPL-MCNC: 7.3 G/DL (ref 6.8–8.8)
SODIUM SERPL-SCNC: 140 MMOL/L (ref 133–144)

## 2022-05-26 ENCOUNTER — MYC MEDICAL ADVICE (OUTPATIENT)
Dept: FAMILY MEDICINE | Facility: CLINIC | Age: 42
End: 2022-05-26
Payer: COMMERCIAL

## 2022-05-27 NOTE — TELEPHONE ENCOUNTER
Dear Surjit  A chloride of 110 is still considered within normal limits. Your alkaline phosphatase is 32 and the normal limit is 40-50. Given that your liver enzymes are maxim, this is also considered normal and does not require any additional work-up. Small changes in these numbers can be attributed to dehydration.     Nice meeting you.     Fermin Cowan MD

## 2022-09-11 ENCOUNTER — HEALTH MAINTENANCE LETTER (OUTPATIENT)
Age: 42
End: 2022-09-11

## 2022-10-22 ENCOUNTER — NURSE TRIAGE (OUTPATIENT)
Dept: NURSING | Facility: CLINIC | Age: 42
End: 2022-10-22

## 2022-10-23 ENCOUNTER — OFFICE VISIT (OUTPATIENT)
Dept: URGENT CARE | Facility: URGENT CARE | Age: 42
End: 2022-10-23
Payer: COMMERCIAL

## 2022-10-23 VITALS
TEMPERATURE: 99.3 F | HEART RATE: 107 BPM | SYSTOLIC BLOOD PRESSURE: 109 MMHG | DIASTOLIC BLOOD PRESSURE: 67 MMHG | OXYGEN SATURATION: 100 %

## 2022-10-23 DIAGNOSIS — N64.4 BREAST PAIN, RIGHT: Primary | ICD-10-CM

## 2022-10-23 PROCEDURE — 99213 OFFICE O/P EST LOW 20 MIN: CPT | Performed by: PHYSICIAN ASSISTANT

## 2022-10-23 NOTE — PROGRESS NOTES
Breast pain, right  - MA Screen Bilateral w/Rory; Future     Patient's physical exam is more suggestive for swollen lymph node in the breast or perhaps a small breast cyst.  I like her to get a mammogram sometime in the next couple of days to screen.  I have asked the patient to ice and take anti-inflammatories for pain relief in the meantime.  If symptoms improve in the next 48 hours, she does not need to have a mammogram done.    Rest the affected area as much as possible.  Apply ice for 15-20 minutes intermittently as needed and especially after any offending activity. Hot packs are better for muscle spasms and cramping. Daily stretching as tolerated.  As pain recedes, begin normal activities slowly as tolerated.  Consider Physical Therapy after 6 weeks if symptoms not better with conservative care.      Okay to take acetaminophen 500 mg- 2 tabs (Total of 1000 mg) every 8 hrs   Okay to take ibuprofen 200 mg- 3 tabs (Total of 600 mg) every 6 hours        Chaz Holm PA-C  [facility]    Subjective   42 year old who presents to clinic today for the following health issues:    Chest Pain       HPI     Pain History:  When did you first notice your pain? - Acute Pain   Where in your body do you have pain? Chest Pain  Onset/Duration: Started on Fri. Was reaching and shortly after felt pain in right breast. Pain is worst when moving and touch.   Description:   Location: right side underneath the right nipple- Patient has noticed an asymmetry in the niples   Character: sharp pinching pain   Radiation: No, seems to be fairly focal   Duration: seems to be coming and going with certain movements.   Intensity: 4/10  Progression of Symptoms: same  Accompanying Signs & Symptoms:  Shortness of breath: YES  Sweating: YES  Nausea/vomiting: YES  Lightheadedness: No  Palpitations: YES  Fever/Chills: No  Cough: No           Heartburn: No  Patient has history of panic attacks.   History:   Family history of  heart disease: Grandfather had a heart attack when he was in his 70s   Tobacco use: No  Previous similar symptoms: no   Precipitating factors:   Worse with exertion: No  Worse with deep breaths: No           Related to eating: No           Better with burping: No  Alleviating factors: moderate  Therapies tried and outcome: moderate    Patient has not had mammograms in the past.      Review of Systems   Review of Systems   See HPI    Objective    Temp: 99.3  F (37.4  C) Temp src: Temporal BP: 109/67 Pulse: 107     SpO2: 100 %       Physical Exam   Physical Exam  Cardiovascular:      Rate and Rhythm: Normal rate and regular rhythm.      Pulses: Normal pulses.      Heart sounds: Normal heart sounds. No murmur heard.    No friction rub. No gallop.   Pulmonary:      Effort: Pulmonary effort is normal. No respiratory distress.      Breath sounds: Normal breath sounds. No stridor. No wheezing, rhonchi or rales.   Chest:      Chest wall: No tenderness.   Breasts:     Right: Tenderness present. No swelling, bleeding, inverted nipple, nipple discharge or skin change.          Comments: There is a small 2 mm fluctuant node in the area shown above that is tender to touch and is not anchored to the underlying tissue.  It seems to be tender with mild to moderate touch.  The overlying skin does not appear to be abnormal and is without dimples or divots.  No discoloration noted.  Psychiatric:         Mood and Affect: Mood normal.         Behavior: Behavior normal.         Thought Content: Thought content normal.         Judgment: Judgment normal.          No results found for this or any previous visit (from the past 24 hour(s)).

## 2022-10-23 NOTE — TELEPHONE ENCOUNTER
"Breast hurting 30 hours ago yesterday afternoon    Right breast yesterday  Raised arm in shower and unexpected pinching pain 1400  Continued hurting since, only one spot hurts, doesn't feel muscular  Triage Call:    Patient calling to report new onset right breast pain.  Onset of pain was yesterday at 1400.  Patient reports moderate pain intermittent with movement.  Patient denies dimpling of skin, dry nipple, lump, inverted nipple, or fever.  Patient reports right nipple \"appears slightly tugged\".    According to the protocol, patient should see PCP within 24 hours.  Care advice given. Patient's spouse verbalizes understanding and agrees with plan of care. Plans to go to [facility] in the morning.    Tabby Corrigan RN  10/22/22 9:18 PM  [facility] Nurse Advisor     Reason for Disposition    [1] Breast looks infected (spreading redness, feels hot or painful to touch) AND [2] no fever    Additional Information    Negative: Chest pain    Negative: Breastfeeding questions about baby    Negative: Breastfeeding questions about mother (breast symptoms or feeling sick)    Negative: Breastfeeding questions about mother's medicines and diet    Negative: Postpartum breast pain and swelling, not breastfeeding    Negative: Small spot, skin growth or mole    Negative: [1] SEVERE breast pain AND [2] fever > 103 F (39.4 C)    Negative: Patient sounds very sick or weak to the triager    Negative: [1] Breast looks infected (spreading redness, feels hot or painful to touch) AND [2] fever    Protocols used: BREAST SYMPTOMS-A-AH      "

## 2023-04-30 ENCOUNTER — HEALTH MAINTENANCE LETTER (OUTPATIENT)
Age: 43
End: 2023-04-30

## 2024-02-18 ENCOUNTER — HEALTH MAINTENANCE LETTER (OUTPATIENT)
Age: 44
End: 2024-02-18

## 2024-07-07 ENCOUNTER — HEALTH MAINTENANCE LETTER (OUTPATIENT)
Age: 44
End: 2024-07-07

## 2025-01-17 SDOH — HEALTH STABILITY: PHYSICAL HEALTH: ON AVERAGE, HOW MANY DAYS PER WEEK DO YOU ENGAGE IN MODERATE TO STRENUOUS EXERCISE (LIKE A BRISK WALK)?: 5 DAYS

## 2025-01-17 SDOH — HEALTH STABILITY: PHYSICAL HEALTH: ON AVERAGE, HOW MANY MINUTES DO YOU ENGAGE IN EXERCISE AT THIS LEVEL?: 20 MIN

## 2025-01-17 ASSESSMENT — ANXIETY QUESTIONNAIRES
6. BECOMING EASILY ANNOYED OR IRRITABLE: SEVERAL DAYS
GAD7 TOTAL SCORE: 7
7. FEELING AFRAID AS IF SOMETHING AWFUL MIGHT HAPPEN: NOT AT ALL
IF YOU CHECKED OFF ANY PROBLEMS ON THIS QUESTIONNAIRE, HOW DIFFICULT HAVE THESE PROBLEMS MADE IT FOR YOU TO DO YOUR WORK, TAKE CARE OF THINGS AT HOME, OR GET ALONG WITH OTHER PEOPLE: SOMEWHAT DIFFICULT
3. WORRYING TOO MUCH ABOUT DIFFERENT THINGS: SEVERAL DAYS
7. FEELING AFRAID AS IF SOMETHING AWFUL MIGHT HAPPEN: NOT AT ALL
4. TROUBLE RELAXING: NEARLY EVERY DAY
1. FEELING NERVOUS, ANXIOUS, OR ON EDGE: MORE THAN HALF THE DAYS
5. BEING SO RESTLESS THAT IT IS HARD TO SIT STILL: NOT AT ALL
GAD7 TOTAL SCORE: 7
GAD7 TOTAL SCORE: 7
8. IF YOU CHECKED OFF ANY PROBLEMS, HOW DIFFICULT HAVE THESE MADE IT FOR YOU TO DO YOUR WORK, TAKE CARE OF THINGS AT HOME, OR GET ALONG WITH OTHER PEOPLE?: SOMEWHAT DIFFICULT
2. NOT BEING ABLE TO STOP OR CONTROL WORRYING: NOT AT ALL

## 2025-01-17 ASSESSMENT — SOCIAL DETERMINANTS OF HEALTH (SDOH): HOW OFTEN DO YOU GET TOGETHER WITH FRIENDS OR RELATIVES?: NEVER

## 2025-01-22 ENCOUNTER — OFFICE VISIT (OUTPATIENT)
Dept: FAMILY MEDICINE | Facility: CLINIC | Age: 45
End: 2025-01-22
Payer: COMMERCIAL

## 2025-01-22 VITALS
BODY MASS INDEX: 23.34 KG/M2 | SYSTOLIC BLOOD PRESSURE: 109 MMHG | DIASTOLIC BLOOD PRESSURE: 77 MMHG | TEMPERATURE: 98.1 F | RESPIRATION RATE: 16 BRPM | OXYGEN SATURATION: 100 % | HEART RATE: 120 BPM | HEIGHT: 67 IN | WEIGHT: 148.7 LBS

## 2025-01-22 DIAGNOSIS — R00.0 TACHYCARDIA: ICD-10-CM

## 2025-01-22 DIAGNOSIS — F41.9 ANXIETY: ICD-10-CM

## 2025-01-22 DIAGNOSIS — R55 VASOVAGAL SYNCOPE: ICD-10-CM

## 2025-01-22 DIAGNOSIS — G43.509 MIGRAINE WITH PERSISTENT VISUAL AURA: ICD-10-CM

## 2025-01-22 DIAGNOSIS — R00.2 PALPITATIONS: ICD-10-CM

## 2025-01-22 DIAGNOSIS — Z13.220 LIPID SCREENING: ICD-10-CM

## 2025-01-22 DIAGNOSIS — Z11.59 NEED FOR HEPATITIS C SCREENING TEST: ICD-10-CM

## 2025-01-22 DIAGNOSIS — N94.6 DYSMENORRHEA: ICD-10-CM

## 2025-01-22 DIAGNOSIS — D64.9 ANEMIA, UNSPECIFIED TYPE: ICD-10-CM

## 2025-01-22 DIAGNOSIS — Z76.89 ENCOUNTER TO ESTABLISH CARE: ICD-10-CM

## 2025-01-22 DIAGNOSIS — Z87.828 HX OF TRAUMA: ICD-10-CM

## 2025-01-22 DIAGNOSIS — Z00.00 ROUTINE GENERAL MEDICAL EXAMINATION AT A HEALTH CARE FACILITY: Primary | ICD-10-CM

## 2025-01-22 DIAGNOSIS — I73.00 RAYNAUD'S DISEASE WITHOUT GANGRENE: ICD-10-CM

## 2025-01-22 DIAGNOSIS — Z13.1 DIABETES MELLITUS SCREENING: ICD-10-CM

## 2025-01-22 DIAGNOSIS — L30.8 OTHER ECZEMA: ICD-10-CM

## 2025-01-22 DIAGNOSIS — L29.2 VULVAR ITCHING: ICD-10-CM

## 2025-01-22 LAB
BACTERIAL VAGINOSIS VAG-IMP: NEGATIVE
CANDIDA DNA VAG QL NAA+PROBE: NOT DETECTED
CANDIDA GLABRATA / CANDIDA KRUSEI DNA: NOT DETECTED
CHOLEST SERPL-MCNC: 168 MG/DL
FASTING STATUS PATIENT QL REPORTED: YES
FASTING STATUS PATIENT QL REPORTED: YES
FERRITIN SERPL-MCNC: 14 NG/ML (ref 6–409)
GLUCOSE SERPL-MCNC: 101 MG/DL (ref 70–99)
HCV AB SERPL QL IA: NONREACTIVE
HDLC SERPL-MCNC: 74 MG/DL
HGB BLD-MCNC: 11.9 G/DL (ref 11.7–17.7)
IRON BINDING CAPACITY (ROCHE): 334 UG/DL (ref 240–430)
IRON SATN MFR SERPL: 25 % (ref 15–46)
IRON SERPL-MCNC: 84 UG/DL (ref 37–157)
LDLC SERPL CALC-MCNC: 83 MG/DL
NONHDLC SERPL-MCNC: 94 MG/DL
T VAGINALIS DNA VAG QL NAA+PROBE: NOT DETECTED
TRANSFERRIN SERPL-MCNC: 273 MG/DL (ref 200–360)
TRIGL SERPL-MCNC: 53 MG/DL
TSH SERPL DL<=0.005 MIU/L-ACNC: 2.62 UIU/ML (ref 0.3–4.2)

## 2025-01-22 RX ORDER — TRIAMCINOLONE ACETONIDE 1 MG/G
OINTMENT TOPICAL 2 TIMES DAILY
Qty: 453.6 G | Refills: 0 | Status: SHIPPED | OUTPATIENT
Start: 2025-01-22

## 2025-01-22 RX ORDER — BUSPIRONE HYDROCHLORIDE 10 MG/1
10 TABLET ORAL 2 TIMES DAILY
Qty: 60 TABLET | Refills: 2 | Status: SHIPPED | OUTPATIENT
Start: 2025-01-22

## 2025-01-22 RX ORDER — FERROUS SULFATE 325(65) MG
325 TABLET, DELAYED RELEASE (ENTERIC COATED) ORAL EVERY OTHER DAY
COMMUNITY

## 2025-01-22 ASSESSMENT — LIFESTYLE VARIABLES
SKIP TO QUESTIONS 9-10: 1
AUDIT-C TOTAL SCORE: 4
HOW OFTEN DO YOU HAVE A DRINK CONTAINING ALCOHOL: 4 OR MORE TIMES A WEEK
HOW MANY STANDARD DRINKS CONTAINING ALCOHOL DO YOU HAVE ON A TYPICAL DAY: 1 OR 2
HOW OFTEN DO YOU HAVE SIX OR MORE DRINKS ON ONE OCCASION: NEVER
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
## 2025-01-22 NOTE — PATIENT INSTRUCTIONS
Dentist rec: [facility]     Great to meet you! I will follow up on lab results     I think it would be helpful to plan a follow up visit in 2-3 months to check in on buspirone and some of the other symptoms we touched on today. Please message in the meantime if any questions.     Patient Education   Preventive Care Advice   This is general advice given by our system to help you stay healthy. However, your care team may have specific advice just for you. Please talk to your care team about your preventive care needs.  Nutrition  Eat 5 or more servings of fruits and vegetables each day.  Try wheat bread, brown rice and whole grain pasta (instead of white bread, rice, and pasta).  Get enough calcium and vitamin D. Check the label on foods and aim for 100% of the RDA (recommended daily allowance).  Lifestyle  Exercise at least 150 minutes each week  (30 minutes a day, 5 days a week).  Do muscle strengthening activities 2 days a week. These help control your weight and prevent disease.  No smoking.  Wear sunscreen to prevent skin cancer.  Have a dental exam and cleaning every 6 months.  Yearly exams  See your health care team every year to talk about:  Any changes in your health.  Any medicines your care team has prescribed.  Preventive care, family planning, and ways to prevent chronic diseases.  Shots (vaccines)   HPV shots (up to age 26), if you've never had them before.  Hepatitis B shots (up to age 59), if you've never had them before.  COVID-19 shot: Get this shot when it's due.  Flu shot: Get a flu shot every year.  Tetanus shot: Get a tetanus shot every 10 years.  Pneumococcal, hepatitis A, and RSV shots: Ask your care team if you need these based on your risk.  Shingles shot (for age 50 and up)  General health tests  Diabetes screening:  Starting at age 35, Get screened for diabetes at least every 3 years.  If you are younger than age 35, ask your care team if you should be screened for  diabetes.  Cholesterol test: At age 39, start having a cholesterol test every 5 years, or more often if advised.  Bone density scan (DEXA): At age 50, ask your care team if you should have this scan for osteoporosis (brittle bones).  Hepatitis C: Get tested at least once in your life.  STIs (sexually transmitted infections)  Before age 24: Ask your care team if you should be screened for STIs.  After age 24: Get screened for STIs if you're at risk. You are at risk for STIs (including HIV) if:  You are sexually active with more than one person.  You don't use condoms every time.  You or a partner was diagnosed with a sexually transmitted infection.  If you are at risk for HIV, ask about PrEP medicine to prevent HIV.  Get tested for HIV at least once in your life, whether you are at risk for HIV or not.  Cancer screening tests  Cervical cancer screening: If you have a cervix, begin getting regular cervical cancer screening tests starting at age 21.  Breast cancer scan (mammogram): If you've ever had breasts, begin having regular mammograms starting at age 40. This is a scan to check for breast cancer.  Colon cancer screening: It is important to start screening for colon cancer at age 45.  Have a colonoscopy test every 10 years (or more often if you're at risk) Or, ask your provider about stool tests like a FIT test every year or Cologuard test every 3 years.  To learn more about your testing options, visit:   .  For help making a decision, visit:   https://bit.ly/ou93239.  Prostate cancer screening test: If you have a prostate, ask your care team if a prostate cancer screening test (PSA) at age 55 is right for you.  Lung cancer screening: If you are a current or former smoker ages 50 to 80, ask your care team if ongoing lung cancer screenings are right for you.  For informational purposes only. Not to replace the advice of your health care provider. Copyright   2023 [employer]. All rights reserved.  Clinically reviewed by the [facility]. [employer] [id number] - REV 01/24.

## 2025-01-31 ENCOUNTER — TELEPHONE (OUTPATIENT)
Dept: FAMILY MEDICINE | Facility: CLINIC | Age: 45
End: 2025-01-31

## 2025-01-31 NOTE — TELEPHONE ENCOUNTER
[facility] phone call message- general phone call:    Reason for call: The patient's spouse was looking to reschedule the patient's appt scheduled for 2/6/25. The spouse stated the PCP offered a MONICA time slot but there is no record of this. The PCP is booked until April 2025.    The spouse would like a call back.    Return call needed: Yes    OK to leave a message on voice mail? Yes    Primary language: English      needed? No    Call taken on January 31, 2025 at 1:08 PM by Gricel Smith

## 2025-02-03 NOTE — TELEPHONE ENCOUNTER
Reason for call: Schedule appointment     Attempt to reach: 1st    Outcome:Left voicemail    Detailed message left? Yes, patient's name in VM message. Asked pt to call back to schedule appt per MONICA Gary.     Please return call to [facility]     Clinic phone number [phone]

## 2025-06-11 ENCOUNTER — LAB (OUTPATIENT)
Dept: LAB | Facility: CLINIC | Age: 45
End: 2025-06-11
Payer: COMMERCIAL

## 2025-06-11 DIAGNOSIS — Z78.9 VEGETARIAN DIET: ICD-10-CM

## 2025-06-11 DIAGNOSIS — E61.1 IRON DEFICIENCY: ICD-10-CM

## 2025-06-11 DIAGNOSIS — M25.449 SWELLING OF FINGER JOINT, UNSPECIFIED LATERALITY: ICD-10-CM

## 2025-06-11 LAB
ERYTHROCYTE [SEDIMENTATION RATE] IN BLOOD BY WESTERGREN METHOD: 9 MM/HR (ref 0–20)
FOLATE SERPL-MCNC: 9.1 NG/ML (ref 4.6–34.8)
HGB BLD-MCNC: 11.9 G/DL (ref 11.7–17.7)
MCV RBC AUTO: 89 FL (ref 78–100)

## 2025-06-11 PROCEDURE — 82728 ASSAY OF FERRITIN: CPT

## 2025-06-11 PROCEDURE — 86140 C-REACTIVE PROTEIN: CPT

## 2025-06-11 PROCEDURE — 85018 HEMOGLOBIN: CPT

## 2025-06-11 PROCEDURE — 85652 RBC SED RATE AUTOMATED: CPT

## 2025-06-11 PROCEDURE — 82607 VITAMIN B-12: CPT

## 2025-06-11 PROCEDURE — 36415 COLL VENOUS BLD VENIPUNCTURE: CPT

## 2025-06-11 PROCEDURE — 82746 ASSAY OF FOLIC ACID SERUM: CPT

## 2025-06-12 LAB
CCP AB SER IA-ACNC: 1 U/ML
CRP SERPL-MCNC: <3 MG/L
FERRITIN SERPL-MCNC: 16 NG/ML (ref 6–409)
VIT B12 SERPL-MCNC: 250 PG/ML (ref 232–1245)

## 2025-06-13 ENCOUNTER — RESULTS FOLLOW-UP (OUTPATIENT)
Dept: FAMILY MEDICINE | Facility: CLINIC | Age: 45
End: 2025-06-13